# Patient Record
Sex: FEMALE | Race: BLACK OR AFRICAN AMERICAN | Employment: UNEMPLOYED
[De-identification: names, ages, dates, MRNs, and addresses within clinical notes are randomized per-mention and may not be internally consistent; named-entity substitution may affect disease eponyms.]

---

## 2024-04-17 ENCOUNTER — OFFICE VISIT (OUTPATIENT)
Facility: CLINIC | Age: 24
End: 2024-04-17
Payer: COMMERCIAL

## 2024-04-17 VITALS
BODY MASS INDEX: 28.18 KG/M2 | OXYGEN SATURATION: 99 % | HEIGHT: 62 IN | WEIGHT: 153.13 LBS | SYSTOLIC BLOOD PRESSURE: 118 MMHG | HEART RATE: 84 BPM | TEMPERATURE: 98.6 F | RESPIRATION RATE: 16 BRPM | DIASTOLIC BLOOD PRESSURE: 82 MMHG

## 2024-04-17 DIAGNOSIS — N83.201 CYST OF RIGHT OVARY: Primary | ICD-10-CM

## 2024-04-17 PROBLEM — Z34.90 PREGNANCY: Status: ACTIVE | Noted: 2019-09-04

## 2024-04-17 PROCEDURE — 99204 OFFICE O/P NEW MOD 45 MIN: CPT | Performed by: STUDENT IN AN ORGANIZED HEALTH CARE EDUCATION/TRAINING PROGRAM

## 2024-04-17 RX ORDER — DROSPIRENONE AND ETHINYL ESTRADIOL 0.03MG-3MG
1 KIT ORAL DAILY
Qty: 3 PACKET | Refills: 1 | Status: SHIPPED | OUTPATIENT
Start: 2024-04-17

## 2024-04-17 SDOH — ECONOMIC STABILITY: HOUSING INSECURITY
IN THE LAST 12 MONTHS, WAS THERE A TIME WHEN YOU DID NOT HAVE A STEADY PLACE TO SLEEP OR SLEPT IN A SHELTER (INCLUDING NOW)?: NO

## 2024-04-17 SDOH — ECONOMIC STABILITY: FOOD INSECURITY: WITHIN THE PAST 12 MONTHS, THE FOOD YOU BOUGHT JUST DIDN'T LAST AND YOU DIDN'T HAVE MONEY TO GET MORE.: NEVER TRUE

## 2024-04-17 SDOH — ECONOMIC STABILITY: INCOME INSECURITY: HOW HARD IS IT FOR YOU TO PAY FOR THE VERY BASICS LIKE FOOD, HOUSING, MEDICAL CARE, AND HEATING?: NOT VERY HARD

## 2024-04-17 SDOH — ECONOMIC STABILITY: FOOD INSECURITY: WITHIN THE PAST 12 MONTHS, YOU WORRIED THAT YOUR FOOD WOULD RUN OUT BEFORE YOU GOT MONEY TO BUY MORE.: SOMETIMES TRUE

## 2024-04-17 ASSESSMENT — PATIENT HEALTH QUESTIONNAIRE - PHQ9
9. THOUGHTS THAT YOU WOULD BE BETTER OFF DEAD, OR OF HURTING YOURSELF: NOT AT ALL
SUM OF ALL RESPONSES TO PHQ QUESTIONS 1-9: 2
4. FEELING TIRED OR HAVING LITTLE ENERGY: SEVERAL DAYS
3. TROUBLE FALLING OR STAYING ASLEEP: SEVERAL DAYS
7. TROUBLE CONCENTRATING ON THINGS, SUCH AS READING THE NEWSPAPER OR WATCHING TELEVISION: NOT AT ALL
1. LITTLE INTEREST OR PLEASURE IN DOING THINGS: NOT AT ALL
SUM OF ALL RESPONSES TO PHQ QUESTIONS 1-9: 2
2. FEELING DOWN, DEPRESSED OR HOPELESS: NOT AT ALL
8. MOVING OR SPEAKING SO SLOWLY THAT OTHER PEOPLE COULD HAVE NOTICED. OR THE OPPOSITE, BEING SO FIGETY OR RESTLESS THAT YOU HAVE BEEN MOVING AROUND A LOT MORE THAN USUAL: NOT AT ALL
SUM OF ALL RESPONSES TO PHQ9 QUESTIONS 1 & 2: 0
SUM OF ALL RESPONSES TO PHQ QUESTIONS 1-9: 2
10. IF YOU CHECKED OFF ANY PROBLEMS, HOW DIFFICULT HAVE THESE PROBLEMS MADE IT FOR YOU TO DO YOUR WORK, TAKE CARE OF THINGS AT HOME, OR GET ALONG WITH OTHER PEOPLE: NOT DIFFICULT AT ALL
6. FEELING BAD ABOUT YOURSELF - OR THAT YOU ARE A FAILURE OR HAVE LET YOURSELF OR YOUR FAMILY DOWN: NOT AT ALL
SUM OF ALL RESPONSES TO PHQ QUESTIONS 1-9: 2
5. POOR APPETITE OR OVEREATING: NOT AT ALL

## 2024-04-17 NOTE — PROGRESS NOTES
\"Have you been to the ER, urgent care clinic since your last visit?  Hospitalized since your last visit?\"    NO    “Have you seen or consulted any other health care providers outside of [facility] since your last visit?”    NO    Chief Complaint   Patient presents with    Establish Care     Pain lower right quadrant. Patient states [facility] and was told she had a cyst there and it would go away once she started her cycle but the pain is still there.         Pap Smear Not within the last 3 years        Click Here for Release of Records Request

## 2024-04-17 NOTE — PROGRESS NOTES
Marta Gómez (: 2000) is a 23 y.o. female, new patient, here for evaluation of the following chief complaint(s):  Establish Care (Pain lower right quadrant. Patient states she went to [facility] back in  and was told she had a cyst there and it would go away once she started her cycle but the pain is still there.)          Assessment & Plan  1. Right-sided lower abdominal pain.  Progression of now chronic condition. Upon examination, no hernia was detected. The patient's pain appears to be interconnected with her menstrual cycle. A prescription for combined estrogen and progesterone birth control pills has been issued. The patient is advised to commence this regimen 3 months post her last depo injection. A follow-up appointment is scheduled for 3 months from now to assess her response to the medication and assess any changes in her pain levels. If there is no improvement, repeat imaging may be considered.    Results  Laboratory Studies  STD testing was negative.  1. Cyst of right ovary  -     drospirenone-ethinyl estradiol (LEANNA 28) 3-0.03 MG TABS; Take 1 tablet by mouth daily, Disp-3 packet, R-1Normal    Return in about 3 months (around 2024) for menstrual cycle follow up.         Subjective   History of Present Illness  The patient presents for evaluation of lower right-sided pain.    The patient sought medical attention at [facility] in 2024 due to persistent right-sided lower abdominal pain. The diagnosis of a potential ovarian cyst was considered, however, due to her commencement of Depo-Provera on 2024, the discomfort persisted. Despite being prescribed ibuprofen 800 mg, she has exhausted her supply and attempted to alleviate the discomfort without success. The pain, described as sharp, is rated as 6 or 7 out of 10, is exacerbated by bending over, such as sweeping trash or lifting objects. This pain, which began prior to 2024, is intermittent. She had a

## 2024-04-17 NOTE — PATIENT INSTRUCTIONS
[facility] Financial Resources*  (Call 211 if need more resources.)   Medical  [facility] Financial Assistance  What they offer: The [employer] Financial Assistance Program helps uninsured patients who do not qualify for government-sponsored health insurance and cannot afford to pay for their medical care. Insured patient may also qualify for assistance based on family income, family size, and medical needs.   Phone Number: [phone]  How to apply for the [employer] Financial Assistance Program:  Option 1: To apply for financial assistance, a patient (or their family or other               provider) should fill out the Financial Assistance Application. Copies of the Financial Assistance Application and the FAP may be obtained for free by calling the [employer] service department at [phone].  Option 2:  The Financial Assistance Application and policy may be obtained for free by downloading a copy from the [employer] website:                       https://www.[employer]/patient-resources/financial-assistance                         The [facility] Uninsured Program       www.Numerates.org  What they offer: The [facility] Uninsured Program can assist you if you’re uninsured and have been diagnosed with chronic, debilitating, or life-threatening disease.   Phone Number: 1-432.463.5196  Website: www.Numerates.org    [facility]  What they offer: Mobile health clinic for uninsured community members  Phone number: [phone]    Utilities  CommonHelp  What they offer: Partnership with the [place] Department of . Assist with finding and applying for government funded programs and benefits.  You can also update your benefits or report changes through e[employer].  Website: https://www.Drifty.[place].gov/  Phone Number: 8335CALLVA ([phone])    [employer] [place] [employer] EnergyShare  What they offer: EnergyShare is [employer]’s energy assistance program of last resort

## 2024-08-20 ENCOUNTER — HOSPITAL ENCOUNTER (OUTPATIENT)
Facility: HOSPITAL | Age: 24
Setting detail: SPECIMEN
Discharge: HOME | End: 2024-08-23
Payer: COMMERCIAL

## 2024-08-20 ENCOUNTER — OFFICE VISIT (OUTPATIENT)
Facility: CLINIC | Age: 24
End: 2024-08-20
Payer: COMMERCIAL

## 2024-08-20 VITALS
WEIGHT: 161.13 LBS | HEART RATE: 113 BPM | HEIGHT: 62 IN | RESPIRATION RATE: 16 BRPM | TEMPERATURE: 98.9 F | BODY MASS INDEX: 29.65 KG/M2 | SYSTOLIC BLOOD PRESSURE: 130 MMHG | OXYGEN SATURATION: 99 % | DIASTOLIC BLOOD PRESSURE: 94 MMHG

## 2024-08-20 DIAGNOSIS — N30.01 ACUTE CYSTITIS WITH HEMATURIA: Primary | ICD-10-CM

## 2024-08-20 DIAGNOSIS — N89.8 VAGINAL DISCHARGE: ICD-10-CM

## 2024-08-20 DIAGNOSIS — N30.01 ACUTE CYSTITIS WITH HEMATURIA: ICD-10-CM

## 2024-08-20 DIAGNOSIS — N94.6 DYSMENORRHEA: ICD-10-CM

## 2024-08-20 DIAGNOSIS — R30.9 URINATION PAIN: ICD-10-CM

## 2024-08-20 DIAGNOSIS — Z91.89 AT RISK FOR SEXUALLY TRANSMITTED INFECTION DUE TO UNPROTECTED SEX: ICD-10-CM

## 2024-08-20 LAB
BILIRUBIN, URINE, POC: ABNORMAL
BLOOD URINE, POC: ABNORMAL
GLUCOSE URINE, POC: NEGATIVE
KETONES, URINE, POC: ABNORMAL
LEUKOCYTE ESTERASE, URINE, POC: ABNORMAL
NITRITE, URINE, POC: NEGATIVE
PH, URINE, POC: 5 (ref 4.6–8)
PROTEIN,URINE, POC: ABNORMAL
SPECIFIC GRAVITY, URINE, POC: 1.02 (ref 1–1.03)
URINALYSIS CLARITY, POC: ABNORMAL
URINALYSIS COLOR, POC: YELLOW
UROBILINOGEN, POC: NORMAL

## 2024-08-20 PROCEDURE — 87491 CHLMYD TRACH DNA AMP PROBE: CPT

## 2024-08-20 PROCEDURE — 87591 N.GONORRHOEAE DNA AMP PROB: CPT

## 2024-08-20 PROCEDURE — 87798 DETECT AGENT NOS DNA AMP: CPT

## 2024-08-20 PROCEDURE — 87086 URINE CULTURE/COLONY COUNT: CPT

## 2024-08-20 PROCEDURE — 81003 URINALYSIS AUTO W/O SCOPE: CPT | Performed by: STUDENT IN AN ORGANIZED HEALTH CARE EDUCATION/TRAINING PROGRAM

## 2024-08-20 PROCEDURE — 87661 TRICHOMONAS VAGINALIS AMPLIF: CPT

## 2024-08-20 PROCEDURE — 99214 OFFICE O/P EST MOD 30 MIN: CPT | Performed by: STUDENT IN AN ORGANIZED HEALTH CARE EDUCATION/TRAINING PROGRAM

## 2024-08-20 PROCEDURE — 87801 DETECT AGNT MULT DNA AMPLI: CPT

## 2024-08-20 RX ORDER — SULFAMETHOXAZOLE AND TRIMETHOPRIM 800; 160 MG/1; MG/1
1 TABLET ORAL 2 TIMES DAILY
Qty: 6 TABLET | Refills: 0 | Status: SHIPPED | OUTPATIENT
Start: 2024-08-20 | End: 2024-08-23

## 2024-08-20 ASSESSMENT — PATIENT HEALTH QUESTIONNAIRE - PHQ9
SUM OF ALL RESPONSES TO PHQ QUESTIONS 1-9: 0
SUM OF ALL RESPONSES TO PHQ9 QUESTIONS 1 & 2: 0
1. LITTLE INTEREST OR PLEASURE IN DOING THINGS: NOT AT ALL
2. FEELING DOWN, DEPRESSED OR HOPELESS: NOT AT ALL
SUM OF ALL RESPONSES TO PHQ QUESTIONS 1-9: 0

## 2024-08-20 NOTE — PROGRESS NOTES
Chief Complaint   Patient presents with    Urinary Pain     Symptoms started 2 days ago. Patient would like to get the STD testing. Patient states she is having a little discharge.         1. \"Have you been to the ER, urgent care clinic since your last visit?  Hospitalized since your last visit?\" No    2. \"Have you seen or consulted any other health care providers outside of the [facility] since your last visit?\" No     3. For patients aged 45-75: Has the patient had a colonoscopy / FIT/ Cologuard? NA - based on age      If the patient is female:    4. For patients aged 40-74: Has the patient had a mammogram within the past 2 years? NA - based on age or sex      5. For patients aged 21-65: Has the patient had a pap smear? No  
infection due to unprotected sex  3. Urination pain  -     AMB POC URINALYSIS DIP STICK AUTO W/O MICRO  4. Vaginal discharge  -     C.trachomatis N.gonorrhoeae DNA; Future  -     Bacterial Vaginosis Panel; Future  -     Candida Vaginitis and Trichomonas Vaginalis Amplification; Future  5. Dysmenorrhea    Return in about 6 weeks (around 10/1/2024) for Nexplanon placement.         Subjective   History of Present Illness  The patient presents for evaluation of urinary symptoms and vaginal discharge.    She has been experiencing urinary symptoms for the past 2 days, which include frequent urination with low volume and a tingling sensation during urination. Her last urinary tract infection (UTI) was at the beginning of this year. She maintains good hydration by drinking water throughout the day. She reports no fevers, chills, or back pain in the past few days. She has no known allergies to antibiotics but notes that Flagyl tends to cause her yeast infections. She does not recall ever taking Bactrim.    She reports a yellowish vaginal discharge that started a week ago. She does not have menstrual cycles on Depo, and she received her last Depo-Provera injection on 06/21/2024 from [facility]. She has been sexually active with two partners in the past year and uses condoms for protection. A year ago, she tested positive for Trichomonas and was treated accordingly. Since then, she has not had any new sexual partners. She is interested in getting tested for sexually transmitted diseases (STDs).    She is currently using Depo-Provera for birth control and is considering switching to Nexplanon. Her next Depo-Provera injection is scheduled for October 2024.           Objective   Blood pressure (!) 130/94, pulse (!) 113, temperature 98.9 °F (37.2 °C), temperature source Tympanic, resp. rate 16, height 1.575 m (5' 2\"), weight 73.1 kg (161 lb 2 oz), last menstrual period 12/26/2023, SpO2 99%.Body mass index is 29.47

## 2024-08-21 DIAGNOSIS — N76.0 BV (BACTERIAL VAGINOSIS): Primary | ICD-10-CM

## 2024-08-21 DIAGNOSIS — B96.89 BV (BACTERIAL VAGINOSIS): Primary | ICD-10-CM

## 2024-08-21 LAB
BACTERIA SPEC CULT: NORMAL
BV DNA PNL VAG NAA+PROBE: POSITIVE
C GLABRATA DNA VAG QL NAA+PROBE: NEGATIVE
C TRACH RRNA SPEC QL NAA+PROBE: NEGATIVE
CANDIDA DNA VAG QL NAA+PROBE: NEGATIVE
N GONORRHOEA RRNA SPEC QL NAA+PROBE: NEGATIVE
SERVICE CMNT-IMP: NORMAL
SPECIMEN SOURCE: NORMAL
T VAGINALIS RRNA SPEC QL NAA+PROBE: NEGATIVE

## 2024-08-21 RX ORDER — METRONIDAZOLE 500 MG/1
500 TABLET ORAL 2 TIMES DAILY
Qty: 14 TABLET | Refills: 0 | Status: SHIPPED | OUTPATIENT
Start: 2024-08-21 | End: 2024-08-28

## 2024-12-05 ENCOUNTER — OFFICE VISIT (OUTPATIENT)
Facility: CLINIC | Age: 24
End: 2024-12-05
Payer: COMMERCIAL

## 2024-12-05 VITALS
HEART RATE: 94 BPM | WEIGHT: 161.25 LBS | BODY MASS INDEX: 29.49 KG/M2 | TEMPERATURE: 98.4 F | RESPIRATION RATE: 16 BRPM | OXYGEN SATURATION: 99 % | DIASTOLIC BLOOD PRESSURE: 72 MMHG | SYSTOLIC BLOOD PRESSURE: 102 MMHG

## 2024-12-05 DIAGNOSIS — N94.6 DYSMENORRHEA: Primary | ICD-10-CM

## 2024-12-05 PROCEDURE — 99213 OFFICE O/P EST LOW 20 MIN: CPT | Performed by: STUDENT IN AN ORGANIZED HEALTH CARE EDUCATION/TRAINING PROGRAM

## 2024-12-05 NOTE — PROGRESS NOTES
\"Have you been to the ER, urgent care clinic since your last visit?  Hospitalized since your last visit?\"    NO    “Have you seen or consulted any other health care providers outside our system since your last visit?”    NO     “Have you had a pap smear?”    NO    No cervical cancer screening on file              
Mental status is at baseline.   Musculoskeletal - Grossly normal ROM, Gait normal.    Skin - normal coloration and normal turgor.          Medical History- Reviewed Social History- Reviewed Surgical History- Reviewed   Marta has no past medical history on file. Marta reports that she has never smoked. She has never used smokeless tobacco. She reports that she does not currently use alcohol after a past usage of about 3.0 standard drinks of alcohol per week. She reports that she does not use drugs. Marta has a past surgical history that includes  section (2019).         Problem List- Reviewed   Marta has Abdominal right upper quadrant rigidity; Constipation; Dermatitis; Postinflammatory hyperpigmentation; Single delivery by ; and Dysmenorrhea on their problem list.       Current Outpatient Medications   Medication Instructions    etonogestrel (NEXPLANON) 68 mg, Subdermal, ONCE             The patient (or guardian, if applicable) and other individuals in attendance with the patient were advised that Artificial Intelligence will be utilized during this visit to record and process the conversation to generate a clinical note. The patient (or guardian, if applicable) and other individuals in attendance at the appointment consented to the use of AI, including the recording.      An electronic signature was used to authenticate this note.    --Robbie Cr MD

## 2025-01-02 ENCOUNTER — TELEPHONE (OUTPATIENT)
Facility: CLINIC | Age: 25
End: 2025-01-02

## 2025-07-02 ENCOUNTER — OFFICE VISIT (OUTPATIENT)
Facility: CLINIC | Age: 25
End: 2025-07-02
Payer: COMMERCIAL

## 2025-07-02 VITALS
WEIGHT: 155.13 LBS | OXYGEN SATURATION: 98 % | BODY MASS INDEX: 28.55 KG/M2 | DIASTOLIC BLOOD PRESSURE: 76 MMHG | TEMPERATURE: 97.7 F | HEART RATE: 104 BPM | RESPIRATION RATE: 16 BRPM | HEIGHT: 62 IN | SYSTOLIC BLOOD PRESSURE: 100 MMHG

## 2025-07-02 DIAGNOSIS — N94.6 DYSMENORRHEA: Primary | ICD-10-CM

## 2025-07-02 PROCEDURE — 99214 OFFICE O/P EST MOD 30 MIN: CPT | Performed by: STUDENT IN AN ORGANIZED HEALTH CARE EDUCATION/TRAINING PROGRAM

## 2025-07-02 RX ORDER — NAPROXEN 500 MG/1
500 TABLET ORAL 2 TIMES DAILY
COMMUNITY
Start: 2025-06-28

## 2025-07-02 SDOH — ECONOMIC STABILITY: FOOD INSECURITY: WITHIN THE PAST 12 MONTHS, YOU WORRIED THAT YOUR FOOD WOULD RUN OUT BEFORE YOU GOT MONEY TO BUY MORE.: SOMETIMES TRUE

## 2025-07-02 SDOH — ECONOMIC STABILITY: FOOD INSECURITY: WITHIN THE PAST 12 MONTHS, THE FOOD YOU BOUGHT JUST DIDN'T LAST AND YOU DIDN'T HAVE MONEY TO GET MORE.: SOMETIMES TRUE

## 2025-07-02 SDOH — ECONOMIC STABILITY: INCOME INSECURITY: IN THE LAST 12 MONTHS, WAS THERE A TIME WHEN YOU WERE NOT ABLE TO PAY THE MORTGAGE OR RENT ON TIME?: NO

## 2025-07-02 SDOH — ECONOMIC STABILITY: TRANSPORTATION INSECURITY
IN THE PAST 12 MONTHS, HAS THE LACK OF TRANSPORTATION KEPT YOU FROM MEDICAL APPOINTMENTS OR FROM GETTING MEDICATIONS?: NO

## 2025-07-02 SDOH — ECONOMIC STABILITY: TRANSPORTATION INSECURITY
IN THE PAST 12 MONTHS, HAS LACK OF TRANSPORTATION KEPT YOU FROM MEETINGS, WORK, OR FROM GETTING THINGS NEEDED FOR DAILY LIVING?: NO

## 2025-07-02 ASSESSMENT — PATIENT HEALTH QUESTIONNAIRE - PHQ9
2. FEELING DOWN, DEPRESSED OR HOPELESS: MORE THAN HALF THE DAYS
5. POOR APPETITE OR OVEREATING: NOT AT ALL
1. LITTLE INTEREST OR PLEASURE IN DOING THINGS: MORE THAN HALF THE DAYS
3. TROUBLE FALLING OR STAYING ASLEEP: NOT AT ALL
3. TROUBLE FALLING OR STAYING ASLEEP: NOT AT ALL
6. FEELING BAD ABOUT YOURSELF - OR THAT YOU ARE A FAILURE OR HAVE LET YOURSELF OR YOUR FAMILY DOWN: NOT AT ALL
7. TROUBLE CONCENTRATING ON THINGS, SUCH AS READING THE NEWSPAPER OR WATCHING TELEVISION: NOT AT ALL
7. TROUBLE CONCENTRATING ON THINGS, SUCH AS READING THE NEWSPAPER OR WATCHING TELEVISION: NOT AT ALL
6. FEELING BAD ABOUT YOURSELF - OR THAT YOU ARE A FAILURE OR HAVE LET YOURSELF OR YOUR FAMILY DOWN: NOT AT ALL
SUM OF ALL RESPONSES TO PHQ QUESTIONS 1-9: 6
SUM OF ALL RESPONSES TO PHQ QUESTIONS 1-9: 6
4. FEELING TIRED OR HAVING LITTLE ENERGY: MORE THAN HALF THE DAYS
SUM OF ALL RESPONSES TO PHQ QUESTIONS 1-9: 6
8. MOVING OR SPEAKING SO SLOWLY THAT OTHER PEOPLE COULD HAVE NOTICED. OR THE OPPOSITE - BEING SO FIDGETY OR RESTLESS THAT YOU HAVE BEEN MOVING AROUND A LOT MORE THAN USUAL: NOT AT ALL
4. FEELING TIRED OR HAVING LITTLE ENERGY: MORE THAN HALF THE DAYS
9. THOUGHTS THAT YOU WOULD BE BETTER OFF DEAD, OR OF HURTING YOURSELF: NOT AT ALL
SUM OF ALL RESPONSES TO PHQ9 QUESTIONS 1 & 2: 4
9. THOUGHTS THAT YOU WOULD BE BETTER OFF DEAD, OR OF HURTING YOURSELF: NOT AT ALL
10. IF YOU CHECKED OFF ANY PROBLEMS, HOW DIFFICULT HAVE THESE PROBLEMS MADE IT FOR YOU TO DO YOUR WORK, TAKE CARE OF THINGS AT HOME, OR GET ALONG WITH OTHER PEOPLE: NOT DIFFICULT AT ALL
1. LITTLE INTEREST OR PLEASURE IN DOING THINGS: MORE THAN HALF THE DAYS
10. IF YOU CHECKED OFF ANY PROBLEMS, HOW DIFFICULT HAVE THESE PROBLEMS MADE IT FOR YOU TO DO YOUR WORK, TAKE CARE OF THINGS AT HOME, OR GET ALONG WITH OTHER PEOPLE: NOT DIFFICULT AT ALL
2. FEELING DOWN, DEPRESSED OR HOPELESS: MORE THAN HALF THE DAYS
5. POOR APPETITE OR OVEREATING: NOT AT ALL
SUM OF ALL RESPONSES TO PHQ QUESTIONS 1-9: 6
SUM OF ALL RESPONSES TO PHQ QUESTIONS 1-9: 6
8. MOVING OR SPEAKING SO SLOWLY THAT OTHER PEOPLE COULD HAVE NOTICED. OR THE OPPOSITE, BEING SO FIGETY OR RESTLESS THAT YOU HAVE BEEN MOVING AROUND A LOT MORE THAN USUAL: NOT AT ALL

## 2025-07-02 NOTE — PATIENT INSTRUCTIONS
[facility] - Financial Resources*  (Call [employer]/211 if need more resources.)    Utilities  CommonHelp  What they offer: Partnership with the [facility] of . Assist with finding and applying for government funded programs and benefits. You can also update your benefits or report changes through [employer].  Website: https://www.[employer] StudiosvirginiaIdentyx/  Phone Number:  500.896.2160    [facility] [employer] Energy[employer]  What they offer: EnergyShare is [facility]'s energy assistance program of last resort for  anyone who faces financial hardships from unemployment or family crisis.  Phone Number: [phone]  Address: [address] [place] [address]  Website: https://www.[employer]/[place]/billing/energy-assistance      Energy Assistance Programs (EAP) - [facility] of   What they offer: EAP assists low income households in meeting their immediate home energy needs.  Phone Number: 836.575.9140 or contact your local department of   Website: https://www.[employer].[place].[facility]/benefit/ea/      Help to Others (H2O)  What they offer: provides one-time financial assistance to individuals facing disconnection of water services due to a crisis.  https://www.gantto/wvaw/customer-service-billing/xkmeogzh-syewcqwplq-jdmzoelh/h2o-help-to-others-program    THRIVE  What they offer:  Provides emergency assistance for utility bills, rent, and other critical expenses to residents facing financial crises.  818.834.6061, https://www.findhelp.org/thrive-[place]--cfyliwg-xwlb-mg--critical-bill-assistance/5844057014263011?nggxnt=82502      [facility]  What they offer: Bill and utility assistance  Phone number: [phone].  Website: https://[employer]    [employer]  What they offer: Utility, rent, mortgage assistance   Phone number: [phone].  Website: https://GLO.org  Regional Housing Crisis Hotline  Can assist with

## 2025-07-02 NOTE — PROGRESS NOTES
Have you been to the ER, urgent care clinic since your last visit?  Hospitalized since your last visit?   YES - When: approximately 4 days ago.  Where and Why: ER for abdominal pain.    Have you seen or consulted any other health care providers outside our system since your last visit?   YES - When: approximately 4 days ago.  Where and Why: [facility] Pain in abdomin.     “Have you had a pap smear?”    YES - Where: ER Nurse/CMA to request most recent records if not in the chart    No cervical cancer screening on file

## 2025-07-02 NOTE — PROGRESS NOTES
Marta Gómez (: 2000) is a 24 y.o. female, established patient, here for evaluation of the following chief complaint(s):  Follow-up (Patient states she went to the ER on the  for abdominal pain. Patient states a vaginal US was done and she was diagnosis with fibroids.)        Assessment & Plan  1. Dysmenorrhea: Chronic. Ultrasound indicated adenomyosis and small fibroid (1.7 cm). Labs negative for STDs, pregnancy, or other infections.  - Advised naproxen or higher doses of ibuprofen (600-800 mg).  - Suggested IUD as treatment option.  - Referral to OB/GYN specialist for further evaluation and management.    Follow-up  - Referral to OB/GYN specialist for further evaluation and management.    Results  - Labs:    - STD Panel: Negative    - Pregnancy Test: Negative    - Infection Panel: Negative    - Imaging:    - Ultrasound: Adenomyosis and small fibroid (1.7 cm)  1. Dysmenorrhea  -     External Referral To Ob-Gyn    Return if symptoms worsen or fail to improve.         Subjective   History of Present Illness  The patient presents for evaluation of dysmenorrhea.    She reports experiencing pelvic pain since 2025. Pain is constant, rated 4/10, and not associated with her menstrual cycle. Recently, she has cramps without bleeding. She is not trying to conceive and does not plan to get pregnant again. She suspects Depo-Provera injections may contribute to her symptoms and is considering discontinuation. Next injection due 2025.    She sought medical attention at [facility], where lab tests and imaging revealed uterine fibroids and adenomyosis. Advised to consult an OB/GYN specialist, awaiting appointment. Ibuprofen 200 mg has been ineffective; prescribed naproxen today.    GYNECOLOGICAL HISTORY:  - Menstrual Pain: Present           Objective   Blood pressure 100/76, pulse (!) 104, temperature 97.7 °F (36.5 °C), temperature source Tympanic, resp. rate 16, height 1.575 m (5' 2\"), weight